# Patient Record
Sex: FEMALE | Race: ASIAN | NOT HISPANIC OR LATINO
[De-identification: names, ages, dates, MRNs, and addresses within clinical notes are randomized per-mention and may not be internally consistent; named-entity substitution may affect disease eponyms.]

---

## 2017-06-02 ENCOUNTER — APPOINTMENT (OUTPATIENT)
Dept: MAMMOGRAPHY | Facility: CLINIC | Age: 62
End: 2017-06-02

## 2017-06-02 ENCOUNTER — APPOINTMENT (OUTPATIENT)
Dept: ULTRASOUND IMAGING | Facility: CLINIC | Age: 62
End: 2017-06-02

## 2017-06-02 ENCOUNTER — OUTPATIENT (OUTPATIENT)
Dept: OUTPATIENT SERVICES | Facility: HOSPITAL | Age: 62
LOS: 1 days | End: 2017-06-02
Payer: COMMERCIAL

## 2017-06-02 DIAGNOSIS — Z00.8 ENCOUNTER FOR OTHER GENERAL EXAMINATION: ICD-10-CM

## 2017-06-02 DIAGNOSIS — Z98.89 OTHER SPECIFIED POSTPROCEDURAL STATES: Chronic | ICD-10-CM

## 2017-06-02 DIAGNOSIS — R92.2 INCONCLUSIVE MAMMOGRAM: ICD-10-CM

## 2017-06-02 PROCEDURE — 77063 BREAST TOMOSYNTHESIS BI: CPT

## 2017-06-02 PROCEDURE — 77067 SCR MAMMO BI INCL CAD: CPT

## 2017-06-02 PROCEDURE — 76830 TRANSVAGINAL US NON-OB: CPT

## 2017-06-02 PROCEDURE — 76641 ULTRASOUND BREAST COMPLETE: CPT

## 2018-06-15 ENCOUNTER — OUTPATIENT (OUTPATIENT)
Dept: OUTPATIENT SERVICES | Facility: HOSPITAL | Age: 63
LOS: 1 days | End: 2018-06-15
Payer: COMMERCIAL

## 2018-06-15 ENCOUNTER — APPOINTMENT (OUTPATIENT)
Dept: ULTRASOUND IMAGING | Facility: CLINIC | Age: 63
End: 2018-06-15
Payer: COMMERCIAL

## 2018-06-15 ENCOUNTER — APPOINTMENT (OUTPATIENT)
Dept: MAMMOGRAPHY | Facility: CLINIC | Age: 63
End: 2018-06-15
Payer: COMMERCIAL

## 2018-06-15 DIAGNOSIS — Z00.8 ENCOUNTER FOR OTHER GENERAL EXAMINATION: ICD-10-CM

## 2018-06-15 DIAGNOSIS — Z98.89 OTHER SPECIFIED POSTPROCEDURAL STATES: Chronic | ICD-10-CM

## 2018-06-15 PROCEDURE — 76856 US EXAM PELVIC COMPLETE: CPT | Mod: 26,59

## 2018-06-15 PROCEDURE — 77063 BREAST TOMOSYNTHESIS BI: CPT | Mod: 26

## 2018-06-15 PROCEDURE — 76856 US EXAM PELVIC COMPLETE: CPT

## 2018-06-15 PROCEDURE — 76830 TRANSVAGINAL US NON-OB: CPT

## 2018-06-15 PROCEDURE — 76641 ULTRASOUND BREAST COMPLETE: CPT | Mod: 26,50

## 2018-06-15 PROCEDURE — 76830 TRANSVAGINAL US NON-OB: CPT | Mod: 26

## 2018-06-15 PROCEDURE — 76641 ULTRASOUND BREAST COMPLETE: CPT

## 2018-06-15 PROCEDURE — 77067 SCR MAMMO BI INCL CAD: CPT | Mod: 26

## 2018-06-15 PROCEDURE — 77067 SCR MAMMO BI INCL CAD: CPT

## 2018-06-15 PROCEDURE — 77063 BREAST TOMOSYNTHESIS BI: CPT

## 2019-03-25 ENCOUNTER — APPOINTMENT (OUTPATIENT)
Dept: INTERNAL MEDICINE | Facility: CLINIC | Age: 64
End: 2019-03-25

## 2019-07-11 ENCOUNTER — OUTPATIENT (OUTPATIENT)
Dept: OUTPATIENT SERVICES | Facility: HOSPITAL | Age: 64
LOS: 1 days | End: 2019-07-11
Payer: COMMERCIAL

## 2019-07-11 ENCOUNTER — APPOINTMENT (OUTPATIENT)
Dept: ULTRASOUND IMAGING | Facility: CLINIC | Age: 64
End: 2019-07-11
Payer: COMMERCIAL

## 2019-07-11 ENCOUNTER — APPOINTMENT (OUTPATIENT)
Dept: RADIOLOGY | Facility: CLINIC | Age: 64
End: 2019-07-11
Payer: COMMERCIAL

## 2019-07-11 ENCOUNTER — APPOINTMENT (OUTPATIENT)
Dept: MAMMOGRAPHY | Facility: CLINIC | Age: 64
End: 2019-07-11
Payer: COMMERCIAL

## 2019-07-11 DIAGNOSIS — Z98.89 OTHER SPECIFIED POSTPROCEDURAL STATES: Chronic | ICD-10-CM

## 2019-07-11 DIAGNOSIS — Z00.8 ENCOUNTER FOR OTHER GENERAL EXAMINATION: ICD-10-CM

## 2019-07-11 PROCEDURE — 77080 DXA BONE DENSITY AXIAL: CPT

## 2019-07-11 PROCEDURE — 77063 BREAST TOMOSYNTHESIS BI: CPT

## 2019-07-11 PROCEDURE — 76641 ULTRASOUND BREAST COMPLETE: CPT | Mod: 26,50

## 2019-07-11 PROCEDURE — 76830 TRANSVAGINAL US NON-OB: CPT | Mod: 26

## 2019-07-11 PROCEDURE — 76830 TRANSVAGINAL US NON-OB: CPT

## 2019-07-11 PROCEDURE — 77063 BREAST TOMOSYNTHESIS BI: CPT | Mod: 26

## 2019-07-11 PROCEDURE — 77080 DXA BONE DENSITY AXIAL: CPT | Mod: 26

## 2019-07-11 PROCEDURE — 76856 US EXAM PELVIC COMPLETE: CPT | Mod: 26,59

## 2019-07-11 PROCEDURE — 76856 US EXAM PELVIC COMPLETE: CPT

## 2019-07-11 PROCEDURE — 77067 SCR MAMMO BI INCL CAD: CPT

## 2019-07-11 PROCEDURE — 77067 SCR MAMMO BI INCL CAD: CPT | Mod: 26

## 2019-07-11 PROCEDURE — 76641 ULTRASOUND BREAST COMPLETE: CPT

## 2019-07-15 ENCOUNTER — APPOINTMENT (OUTPATIENT)
Dept: SURGERY | Facility: CLINIC | Age: 64
End: 2019-07-15
Payer: COMMERCIAL

## 2019-07-15 DIAGNOSIS — N60.91 UNSPECIFIED BENIGN MAMMARY DYSPLASIA OF RIGHT BREAST: ICD-10-CM

## 2019-07-15 PROCEDURE — 99243 OFF/OP CNSLTJ NEW/EST LOW 30: CPT

## 2019-07-15 RX ORDER — METFORMIN HYDROCHLORIDE 500 MG/1
500 TABLET, COATED ORAL
Refills: 0 | Status: ACTIVE | COMMUNITY

## 2019-07-15 NOTE — ASSESSMENT
[FreeTextEntry1] : Patient with history of stage I invasive ductal carcinoma left breast with suspicious right breast nodule. . I recommended US guided core biopsy, If benign, f/u US 1/2020. rto 6 mo

## 2019-07-15 NOTE — HISTORY OF PRESENT ILLNESS
[FreeTextEntry1] : Patient referred by Dr. Rand for evaluation of suspicious right breast nodule. Patient has a prior history of left stage I breast cancer diagnosed in May 2011 treated with partial mastectomy and sentinel lymph node excision followed by  radiation and tamoxifen. right breast biopsy 2015 [facility], papilloma, on slide reviewed no atypia identified and observation chosen.  Patient denies current breast mass or nipple discharge. Menarche 11,  first child born when patient was 37, menopause 53, denies hormone replacement therapy.  \par Bilateral mammo and US 19 right br nodule 11 o'clock 9 x 3 x 7 mm, possible intraductal, biopsy recommended. \par \par

## 2019-07-15 NOTE — PHYSICAL EXAM
[Normocephalic] : normocephalic [EOMI] : extra ocular movement intact [Sclera nonicteric] : sclera nonicteric [Supple] : supple [No Supraclavicular Adenopathy] : no supraclavicular adenopathy [No Cervical Adenopathy] : no cervical adenopathy [Examined in the supine and seated position] : examined in the supine and seated position [Symmetrical] : symmetrical [No dominant masses] : no dominant masses in right breast  [No dominant masses] : no dominant masses left breast [No Nipple Retraction] : no left nipple retraction [No Nipple Discharge] : no left nipple discharge [No Axillary Lymphadenopathy] : no left axillary lymphadenopathy [Soft] : abdomen soft [No Swelling] : no swelling [No Rashes] : no rashes [de-identified] : healed incision [de-identified] : healed incisions

## 2019-07-15 NOTE — CONSULT LETTER
[Dear  ___] : Dear  [unfilled], [Consult Letter:] : I had the pleasure of evaluating your patient, [unfilled]. [Please see my note below.] : Please see my note below. [Consult Closing:] : Thank you very much for allowing me to participate in the care of this patient.  If you have any questions, please do not hesitate to contact me. [FreeTextEntry2] : Dr. Ellen Rand [FreeTextEntry3] : Sincerely yours,\par \par Steph Major MD, FACS\par Assistant Professor of Surgery\par [facility]

## 2019-07-16 ENCOUNTER — APPOINTMENT (OUTPATIENT)
Dept: ULTRASOUND IMAGING | Facility: CLINIC | Age: 64
End: 2019-07-16

## 2019-07-22 ENCOUNTER — FORM ENCOUNTER (OUTPATIENT)
Age: 64
End: 2019-07-22

## 2019-07-23 ENCOUNTER — APPOINTMENT (OUTPATIENT)
Dept: ULTRASOUND IMAGING | Facility: CLINIC | Age: 64
End: 2019-07-23
Payer: COMMERCIAL

## 2019-07-23 ENCOUNTER — OUTPATIENT (OUTPATIENT)
Dept: OUTPATIENT SERVICES | Facility: HOSPITAL | Age: 64
LOS: 1 days | End: 2019-07-23
Payer: COMMERCIAL

## 2019-07-23 ENCOUNTER — RESULT REVIEW (OUTPATIENT)
Age: 64
End: 2019-07-23

## 2019-07-23 DIAGNOSIS — Z98.89 OTHER SPECIFIED POSTPROCEDURAL STATES: Chronic | ICD-10-CM

## 2019-07-23 DIAGNOSIS — N60.01 SOLITARY CYST OF RIGHT BREAST: ICD-10-CM

## 2019-07-23 DIAGNOSIS — Z00.8 ENCOUNTER FOR OTHER GENERAL EXAMINATION: ICD-10-CM

## 2019-07-23 PROCEDURE — A4648: CPT

## 2019-07-23 PROCEDURE — 77065 DX MAMMO INCL CAD UNI: CPT | Mod: 26,RT

## 2019-07-23 PROCEDURE — 88305 TISSUE EXAM BY PATHOLOGIST: CPT | Mod: 26

## 2019-07-23 PROCEDURE — 19083 BX BREAST 1ST LESION US IMAG: CPT | Mod: RT

## 2019-07-23 PROCEDURE — 88305 TISSUE EXAM BY PATHOLOGIST: CPT

## 2019-07-23 PROCEDURE — 19083 BX BREAST 1ST LESION US IMAG: CPT

## 2019-07-23 PROCEDURE — 77065 DX MAMMO INCL CAD UNI: CPT

## 2019-07-25 LAB — SURGICAL PATHOLOGY STUDY: SIGNIFICANT CHANGE UP

## 2020-01-13 ENCOUNTER — APPOINTMENT (OUTPATIENT)
Dept: SURGERY | Facility: CLINIC | Age: 65
End: 2020-01-13

## 2020-07-28 ENCOUNTER — APPOINTMENT (OUTPATIENT)
Dept: ORTHOPEDIC SURGERY | Facility: CLINIC | Age: 65
End: 2020-07-28
Payer: COMMERCIAL

## 2020-07-28 VITALS
WEIGHT: 118 LBS | DIASTOLIC BLOOD PRESSURE: 65 MMHG | HEART RATE: 69 BPM | SYSTOLIC BLOOD PRESSURE: 115 MMHG | HEIGHT: 61 IN | BODY MASS INDEX: 22.28 KG/M2

## 2020-07-28 DIAGNOSIS — Z86.39 PERSONAL HISTORY OF OTHER ENDOCRINE, NUTRITIONAL AND METABOLIC DISEASE: ICD-10-CM

## 2020-07-28 PROCEDURE — 99204 OFFICE O/P NEW MOD 45 MIN: CPT

## 2020-07-28 PROCEDURE — 72100 X-RAY EXAM L-S SPINE 2/3 VWS: CPT

## 2020-07-28 PROCEDURE — 73564 X-RAY EXAM KNEE 4 OR MORE: CPT | Mod: LT

## 2020-07-28 RX ORDER — MELOXICAM 15 MG/1
15 TABLET ORAL DAILY
Qty: 30 | Refills: 2 | Status: ACTIVE | COMMUNITY
Start: 2020-07-28 | End: 1900-01-01

## 2020-07-28 NOTE — DISCUSSION/SUMMARY
[de-identified] : This patient has moderate degenerative disc disease of the lumbar spine as well as mild left knee osteoarthritis.  The patient is not an appropriate candidate for surgical intervention at this time. An extensive discussion was conducted on the natural history of the disease and the variety of surgical and non-surgical options available to the patient including, but not limited to non-steroidal anti-inflammatory medications, steroid injections, physical therapy, maintenance of ideal body weight, and reduction of activity.  I recommended and prescribed a course of Mobic and physical therapy.  The patient is also encouraged to trial a neoprene sleeve knee brace which can be purchased OTC.  The patient is also encouraged to consider use of a cane.  I would like to obtain an MRI of the lumbar spine to evaluate for spinal stenosis.  The patient will be sent for a MRI of the lumbar spine. They will notify me when the MRI is complete and we will arrange for either an in person or telehealth virtual visit to review the results as well as any next steps in the plan.  If the MRI does confirm spinal stenosis then she will referred to physiatry for consideration of epidural steroid injections or selective nerve root injections.\par

## 2020-07-28 NOTE — REASON FOR VISIT
[Initial Visit] : an initial visit for [Knee Pain] : knee pain [Radiculopathy] : radiculopathy [Family Member] : family member

## 2020-07-28 NOTE — HISTORY OF PRESENT ILLNESS
[de-identified] : This is very nice 65-year-old female experiencing left knee pain, which is moderate in intensity. Patient states has had mild intermittent pain however several days ago pain increased in severity with swelling. patient uses Ibuprofen with relief.  She also complains of low back pain that radiates from her low back down the leg to the foot that is associate with numbness and tingling but no weakness.  No bowel or bladder incontinence.  The pain substantially limits activities of daily living. Walking tolerance is reduced. The patient has no tried any treatments to this point including  activity modification, use of an assistive device such as a cane or walker, or physical therapy.  Ibuprofen does provide some relief.  The patient does not use a neoprene sleeve knee brace.

## 2020-07-28 NOTE — PHYSICAL EXAM
[de-identified] : Patient is well nourished, well-developed, in no acute distress, with appropriate mood and affect. The patient is oriented to time, place, and person. Respirations are even and unlabored. Gait evaluation does reveal a limp. There is no inguinal adenopathy. Examination of the contralateral knee shows normal range of motion, strength, no tenderness, and intact skin. The affected limb is well-perfused, without skin lesions, shows a grossly normal motor and sensory examination. Knee motion is significantly reduced and does cause significant pain. The knee moves from 0 to 125 degrees. The knee is stable within that range-of-motion to AP and ML stress. The alignment of the knee is 5 degrees varus. Muscle strength is normal. Pedal pulses are palpable. Hip examination was negative.\par Examination of the lumbar spine reveals full range of motion without pain. There is no tenderness to palpation of the osseous structures or paravertebral soft tissues. There is no muscle spasm. Straight leg raise is negative bilaterally. [de-identified] : Long standing knee, AP knee, lateral knee, and patellar views of the left knee were ordered and taken in the office and demonstrate mild degenerative joint disease of the medial compartment of the knee with joint space narrowing, osteophyte formation, and subchondral sclerosis.\par \par AP and lateral radiographs of the lumbar spine were ordered and obtained the office and demonstrate moderate degenerative disc disease of the lumbar spine but no evidence of spondylolysis or spondylolisthesis

## 2020-07-31 ENCOUNTER — APPOINTMENT (OUTPATIENT)
Dept: ORTHOPEDIC SURGERY | Facility: CLINIC | Age: 65
End: 2020-07-31

## 2020-08-10 ENCOUNTER — APPOINTMENT (OUTPATIENT)
Dept: ORTHOPEDIC SURGERY | Facility: CLINIC | Age: 65
End: 2020-08-10
Payer: COMMERCIAL

## 2020-08-10 PROCEDURE — 99442: CPT

## 2020-08-24 ENCOUNTER — APPOINTMENT (OUTPATIENT)
Dept: PHYSICAL MEDICINE AND REHAB | Facility: CLINIC | Age: 65
End: 2020-08-24
Payer: COMMERCIAL

## 2020-08-24 VITALS
TEMPERATURE: 98 F | HEIGHT: 61 IN | SYSTOLIC BLOOD PRESSURE: 147 MMHG | DIASTOLIC BLOOD PRESSURE: 88 MMHG | HEART RATE: 77 BPM | BODY MASS INDEX: 22.28 KG/M2 | WEIGHT: 118 LBS | OXYGEN SATURATION: 97 %

## 2020-08-24 PROCEDURE — 99203 OFFICE O/P NEW LOW 30 MIN: CPT

## 2020-08-24 RX ORDER — MELOXICAM 15 MG/1
15 TABLET ORAL DAILY
Qty: 30 | Refills: 1 | Status: ACTIVE | COMMUNITY
Start: 2020-08-24 | End: 1900-01-01

## 2020-08-24 NOTE — PHYSICAL EXAM
[Normal] : Oriented to person, place, and time, insight and judgement were intact and the affect was normal [de-identified] : no resp distress [de-identified] : well perfused, nml pulses [de-identified] : L knee w slight effusion, lacks about 5 degrees of flextion, + crepitus and med jt line tenderness, neg antr/postr. drawer. [de-identified] : soft [de-identified] : Ulysses morales;.

## 2020-08-24 NOTE — ASSESSMENT
[FreeTextEntry1] : - Advised to take Meloxicam daily x 3 wks and then prn\par - Referred to PT for L knee and lumbar spine\par - Asked to send imaging results.\par - Referred to Dr. Dacosta for US evaluation and possible aspiration/injection of L knee- will try to schedule soon so she does not have pain at her daughters wedding.\par - Discussed with patient red flags such as bladder/bowel incontinence, weakness, significant increase in pain level in which case patient should immediately present to Emergency Room.\par - f/u prn.

## 2020-08-27 ENCOUNTER — APPOINTMENT (OUTPATIENT)
Dept: PHYSICAL MEDICINE AND REHAB | Facility: CLINIC | Age: 65
End: 2020-08-27
Payer: COMMERCIAL

## 2020-08-27 VITALS
TEMPERATURE: 97.1 F | OXYGEN SATURATION: 96 % | DIASTOLIC BLOOD PRESSURE: 81 MMHG | HEART RATE: 78 BPM | SYSTOLIC BLOOD PRESSURE: 131 MMHG

## 2020-08-27 PROCEDURE — 99215 OFFICE O/P EST HI 40 MIN: CPT | Mod: 25

## 2020-08-27 PROCEDURE — 20611 DRAIN/INJ JOINT/BURSA W/US: CPT | Mod: LT

## 2020-08-27 RX ORDER — METHYLPREDNISOLONE 4 MG/1
4 TABLET ORAL
Qty: 1 | Refills: 0 | Status: ACTIVE | COMMUNITY
Start: 2020-08-27 | End: 1900-01-01

## 2020-08-29 NOTE — ASSESSMENT
[FreeTextEntry1] : 66 yo F who presents with left knee pain.  Left knee aspiration of dark red fluid consistent with hemarthrosis.  Differential diagnosis is broad and includes septic arthritis despite lack of fevers or systemic signs or symptoms.  Despite patient's reporting of non-traumatic onset, I also have a suspicion of injury to ligamentous or cartilaginous structures.  Hemarthrosis also concerning for possible TGCT.\par \par -MRI left knee w/o contrast ordered\par -Left knee aspiration performed.  Aspirate was sent to cell cultures/gram stain, cell counts, and crystals.\par -IM toradol injection performed on this visit\par -RTC following imaging to review results.\par \par Jagdish Dacosta MD\par Spine and Sports Medicine\par \par [employer]\par At [facility]\par \par

## 2020-08-29 NOTE — PHYSICAL EXAM
[FreeTextEntry1] : Gen: NAD, sitting comfortably in a chair\par HEENT: neck supple\par CV: no cyanosis\par Pulm: breathing well on RA\par Abd: soft\par Left knee: +edema, no erythma, ROM limited in all planes, tenderness to palpation medial joint line, +crepitus, neg miguel, neg varus/valgus laxity, neg ant/post drawer\par Low back: ROM limited in all planes secondary to pain, tenderness to palpation lower lumbar paraspinals, neg facet loading, neg FABERE, neg FAIR\par Msk: 5/5 hip flexion B/L, 5/5 knee extension B/L, 5/5 knee flexion B/L, 5/5 dorsiflexion B/L, 5/5 plantarflexion B/L\par Neuro: sensation intact to light touch in all dermatomes in bilateral lower extremities, neg babinski, neg bhatia, 2+ reflexes in bilateral patella, medial hamstrings, and achilles\par

## 2020-08-29 NOTE — PROCEDURE
[de-identified] : Procedure: Ultrasound guided knee aspiration. \par        Side: LEFT\par        Medical Necessity for ultrasound use: Ultrasound guided injection is medically necessary in order to maintain safety and localize injectate to desired location. Visual guidance of structures otherwise not palpable on examination or identified by landmarks is necessary for injection into this structure. The use of ultrasound helps to visualize nerves, vasculature, tendons, ligaments and other soft tissues which may be infiltrated during the course of interventional injection and needle placement. For this reason it is medically necessary to use ultrasound guidance for the injection performed both to avoid injurying or displacing unintended soft tissue structures as well as to improve accuracy which has been shown to directly increase post treatment symptom improvement and resolution when compared to injections performed without guidance. \par        Patient positioning: supine. \par        Target Identification: The body region was palpated as needed in order to localize the area of maximal tenderness. The overlying skin was marked as necessary . \par        Skin Sterilization: The overlying skin was widely prepped with a chloraprep, which was then allowed to dry for 30 seconds. \par        Probe Sterilization: After the ultrasound probe was cleansed with a PDI wipe, it was sterilized with Chloraprep and sterile ultrasound gel was applied as needed. \par        Ultrasound visualization  was then performed to identify the target and estimate the needle length. \par        Procedure: A 27 gauge 1.5 inch needle was then inserted through the sterilized skin and directed to the target as small amounts of lidocaine were injected through it into the overlying skin, subcutaneous tissue, and the overlying tissue as needed. Next, a 21 gauge 2 inch needle attached by an extension tube to a 20 cc syringe was guided toward an area of anechoic signal in the suprapatellar recess representing a knee joint effusion.  20 cc of dark reddish fluid was aspirated consistent with hemarthrosis.  Aspiration was deposited into collection tubes and properly labeled.  Specimens were sent stat to [facility] lab for cell counts, culture, gram stain and crystal analysis.  \par        Injectate: 3 cc 0.5% lidocaine \par        Post Procedure: A Band-Aid was then applied and the patient was advised to leave this on until the next day. The patient tolerated the procedure well and reported significant pain releif following the procedure.\par To relieve pain an IM injection of Toradol (15 mg) was performed into patient's right shoulder.\par

## 2020-08-29 NOTE — HISTORY OF PRESENT ILLNESS
[FreeTextEntry1] : Patient is a 66yo F with PMHx of pre-diabetes, arthritis of the left knee, HTN, hypothyroidism, and stage 1 breast cancer of the left breast s/p partial mastectomy, who presents for initial visit following referral from Dr. Miles for evaluation of left knee pain. Patient reports symptoms that come and go and started in late July 2020. Patient currently still with significant pain with ambulation; reports swelling in left knee, though improved from last Sunday when it initially started to flare. Denied history of recent trauma, injury or falls.  Patient reports that initial onset was in late July but pain had resolved following treatment with meloxicam and physical therapy prescribed by Dr. Cordova.  Patient reports that pain is worse with prolonged standing and ambulation.  XR left consistent with mild osteoarthritis.  Patient endorses swelling.  Denies erythema, redness or warmth.  Denies clicking or locking, however endorses some occasional buckling.  \par \par Of note, patient reports history of chronic low back pain; current issue however, is regarding her left knee. Patient's daughter, who was present for the visit is getting  soon and patient would like pain relief prior to the event.  Patient denies new weakness, numbness or paresthesia.  Denies bowel/bladder dysfunction, fevers, chills, weight loss, night pain, or night sweats.\par

## 2020-08-31 LAB
B PERT IGG+IGM PNL SER: ABNORMAL
COLOR FLD: NORMAL
EOSINOPHIL # FLD MANUAL: 1 %
FLUID INTAKE SUBSTANCE CLASS: NORMAL
LYMPHOCYTES # FLD MANUAL: 0 %
MESOTHL CELL NFR FLD: 0 %
MONOS+MACROS NFR FLD MANUAL: 72 %
NEUTS SEG # FLD MANUAL: 27 %
NRBC # FLD: 0
RBC # FLD MANUAL: ABNORMAL /UL
SYCRY CLARITY: ABNORMAL
SYCRY COLOR: ABNORMAL
SYCRY ID: NORMAL
SYCRY TUBE: NORMAL
TOTAL CELLS COUNTED FLD: 3300 /UL
TUBE TYPE: NORMAL
UNIDENT CELLS NFR FLD MANUAL: 0 %
VARIANT LYMPHS # FLD MANUAL: 0 %

## 2020-09-14 LAB — BACTERIA FLD CULT: NORMAL

## 2020-09-17 ENCOUNTER — APPOINTMENT (OUTPATIENT)
Dept: PHYSICAL MEDICINE AND REHAB | Facility: CLINIC | Age: 65
End: 2020-09-17

## 2020-09-21 ENCOUNTER — APPOINTMENT (OUTPATIENT)
Dept: PHYSICAL MEDICINE AND REHAB | Facility: CLINIC | Age: 65
End: 2020-09-21
Payer: COMMERCIAL

## 2020-09-21 VITALS
HEART RATE: 81 BPM | TEMPERATURE: 97.7 F | OXYGEN SATURATION: 97 % | DIASTOLIC BLOOD PRESSURE: 84 MMHG | SYSTOLIC BLOOD PRESSURE: 135 MMHG

## 2020-09-21 PROCEDURE — 99214 OFFICE O/P EST MOD 30 MIN: CPT | Mod: 25

## 2020-09-21 PROCEDURE — 20611 DRAIN/INJ JOINT/BURSA W/US: CPT | Mod: LT

## 2020-09-22 NOTE — ASSESSMENT
[FreeTextEntry1] : 66 yo F who presents with left knee pain.  Left knee aspiration of dark red fluid consistent with hemarthrosis.  \par \par -MRI left knee w/o contrast reviewed\par - Synovial fluid culture, gram stain, cell counts, and crystal analysis was reviewed\par - US guided left knee aspiration and corticosteroid injection performed on this visit.  Aspiration of serosanguineous fluid on this visit.\par -RTC 2 weeks\par \par Jagdish Dacosta MD\par Spine and Sports Medicine\par \par [employer]\par At hospitals/[facility]\par \par

## 2020-09-22 NOTE — HISTORY OF PRESENT ILLNESS
[FreeTextEntry1] : 9/22/20\par 66 yo F who presents for follow up with left knee and low back pain.  Patient last seen on 8/27/20 and thoracentesis led to aspiration of bloody fluid.  Labs were sent based on fluid collection and was unremarkable.  MRI left knee w/o contrast shows prominent chondromalacia patella with a deep chondral fissure at the medial femoral condyle, subtle horizontal tear of the anterior horn of the lateral meniscus, djd.  In the interim since our last visit, patient prescribed a medrol dose pack without improvement for her knee or back pain.  Patient denies clicking, locking, or buckling.  Patient denies new weakness, numbness or paresthesia.  Denies bowel/bladder dysfunction, fevers, chills, weight loss, night pain, or night sweats.\par \par 8/27/20\par Patient is a 66yo F with PMHx of pre-diabetes, arthritis of the left knee, HTN, hypothyroidism, and stage 1 breast cancer of the left breast s/p partial mastectomy, who presents for initial visit following referral from Dr. Miles for evaluation of left knee pain. Patient reports symptoms that come and go and started in late July 2020. Patient currently still with significant pain with ambulation; reports swelling in left knee, though improved from last Sunday when it initially started to flare. Denied history of recent trauma, injury or falls.  Patient reports that initial onset was in late July but pain had resolved following treatment with meloxicam and physical therapy prescribed by Dr. Cordova.  Patient reports that pain is worse with prolonged standing and ambulation.  XR left consistent with mild osteoarthritis.  Patient endorses swelling.  Denies erythema, redness or warmth.  Denies clicking or locking, however endorses some occasional buckling.  \par \par Of note, patient reports history of chronic low back pain; current issue however, is regarding her left knee. Patient's daughter, who was present for the visit is getting  soon and patient would like pain relief prior to the event.  Patient denies new weakness, numbness or paresthesia.  Denies bowel/bladder dysfunction, fevers, chills, weight loss, night pain, or night sweats.\par

## 2020-09-22 NOTE — PHYSICAL EXAM
[FreeTextEntry1] : Gen: NAD, sitting comfortably in a chair\par HEENT: neck supple\par CV: no cyanosis\par Pulm: breathing well on RA\par Abd: soft\par Left knee: +edema, no erythma, FAROM with pain on extreme flexion and extension, tenderness to palpation medial joint line, +crepitus, +segovia's, neg miguel, neg varus/valgus laxity, neg ant/post drawer\par Low back: ROM limited in all planes secondary to pain, tenderness to palpation lower lumbar paraspinals, neg facet loading, neg FABERE, neg FAIR\par Msk: 5/5 hip flexion B/L, 5/5 knee extension B/L, 5/5 knee flexion B/L, 5/5 dorsiflexion B/L, 5/5 plantarflexion B/L\par Neuro: sensation intact to light touch in all dermatomes in bilateral lower extremities, neg babinski, neg bhatia, 2+ reflexes in bilateral patella, medial hamstrings, and achilles\par

## 2020-09-22 NOTE — PROCEDURE
[de-identified] : Procedure: Ultrasound guided corticosteroid knee Injection. \par        Side: LEFT \par        Medical Necessity for ultrasound use: Ultrasound guided injection is medically necessary in order to maintain safety and localize injectate to desired location. Visual guidance of structures otherwise not palpable on examination or identified by landmarks is necessary for injection into this structure. The use of ultrasound helps to visualize nerves, vasculature, tendons, ligaments and other soft tissues which may be infiltrated during the course of interventional injection and needle placement. For this reason it is medically necessary to use ultrasound guidance for the injection performed both to avoid injurying or displacing unintended soft tissue structures as well as to improve accuracy which has been shown to directly increase post treatment symptom improvement and resolution when compared to injections performed without guidance. \par        Patient positioning: supine. \par        Target Identification: The body region was palpated as needed in order to localize the area of maximal tenderness. The overlying skin was marked as necessary . \par        Skin Sterilization: The overlying skin was widely prepped with a chloraprep, which was then allowed to dry for 30 seconds. \par        Probe Sterilization: After the ultrasound probe was cleansed with a PDI wipe, it was sterilized with Chloraprep and sterile ultrasound gel was applied as needed. \par        Ultrasound visualization  was then performed to identify the target and estimate the needle length. \par        Procedure: A 27 gauge 1.5 inch needle was then inserted through the sterilized skin and directed to the target as small amounts of lidocaine were injected through it into the overlying skin, subcutaneous tissue, and the overlying tissue as needed. Next, a 21 gauge 2 inch needle attached by an extension tube to a 20 cc syringe was guided toward an area of anechoic signal in the suprapatellar recess representing a knee joint effusion.  35 cc of serosanguinous fluid was aspirated and discharged.  A 5 cc syringe filled with the injectate listed below was exchanged onto the extension tube and the target structure was then injected with the injectate listed below under direct ultrasound visualization. Aspiration was negative for blood prior to injection. \par        Injectate: 3 cc 0.5% lidocaine and 20 mg kenalog\par        Total volume: 3.5 cc\par        Post Procedure: A Band-Aid was then applied and the patient was advised to leave this on until the next day. The patient tolerated the procedure well and reported significant pain releif following the procedure.\par

## 2020-10-01 ENCOUNTER — APPOINTMENT (OUTPATIENT)
Dept: PHYSICAL MEDICINE AND REHAB | Facility: CLINIC | Age: 65
End: 2020-10-01

## 2020-10-01 ENCOUNTER — APPOINTMENT (OUTPATIENT)
Dept: PHYSICAL MEDICINE AND REHAB | Facility: CLINIC | Age: 65
End: 2020-10-01
Payer: COMMERCIAL

## 2020-10-01 VITALS
DIASTOLIC BLOOD PRESSURE: 83 MMHG | OXYGEN SATURATION: 97 % | HEART RATE: 69 BPM | TEMPERATURE: 97.6 F | SYSTOLIC BLOOD PRESSURE: 133 MMHG

## 2020-10-01 PROCEDURE — 99214 OFFICE O/P EST MOD 30 MIN: CPT | Mod: 25

## 2020-10-01 PROCEDURE — 20611 DRAIN/INJ JOINT/BURSA W/US: CPT | Mod: LT

## 2020-10-01 NOTE — PHYSICAL EXAM
[FreeTextEntry1] : Gen: NAD, sitting comfortably in a chair\par HEENT: neck supple\par CV: no cyanosis\par Pulm: breathing well on RA\par Abd: soft\par Left knee: some edema, no erythma, FAROM with pain on extreme flexion and extension, tenderness to palpation medial joint line, +crepitus, +segovia's, neg miguel, neg varus/valgus laxity, neg ant/post drawer\par Low back: ROM limited in all planes secondary to pain, tenderness to palpation lower lumbar paraspinals, neg facet loading, neg FABERE, neg FAIR\par Msk: 5/5 hip flexion B/L, 5/5 knee extension B/L, 5/5 knee flexion B/L, 5/5 dorsiflexion B/L, 5/5 plantarflexion B/L\par Neuro: sensation intact to light touch in all dermatomes in bilateral lower extremities, neg babinski, neg bhatia, 2+ reflexes in bilateral patella, medial hamstrings, and achilles\par

## 2020-10-01 NOTE — ASSESSMENT
[FreeTextEntry1] : 64 yo F who presents with left knee pain.  Patient reports significant improvement in left knee following CSI and aspiration.  Will repeat injection. \par \par - US guided left knee aspiration and corticosteroid injection performed on this visit.  Aspiration of serosanguineous fluid on this visit.\par -ice and tylenol prn pain\par -Patient is reluctant to undergo surgical procedure at this time.  Will obtain insurance approval for synvisc injections.\par -RTC 2 weeks\par \par Jagdish Dacosta MD\par Spine and Sports Medicine\par \par [employer]\par At \[facility]\""/[facility]\par \par

## 2020-10-01 NOTE — HISTORY OF PRESENT ILLNESS
[FreeTextEntry1] : 10/1/20\par 66 yo F who presents for follow up with left knee and low back pain.  Patient reports that left knee pain has improved significantly following US guided aspiration and CSI performed on 9/22.  Patient however continues to complain of significant pain and residual swelling following her last injection.  Denies clicking, locking, or buckling.  Patient denies new weakness, numbness or paresthesia.  Denies bowel/bladder dysfunction, fevers, chills, weight loss, night pain, or night sweats.\par \par 9/22/20\par 66 yo F who presents for follow up with left knee and low back pain.  Patient last seen on 8/27/20 and thoracentesis led to aspiration of bloody fluid.  Labs were sent based on fluid collection and was unremarkable.  MRI left knee w/o contrast shows prominent chondromalacia patella with a deep chondral fissure at the medial femoral condyle, subtle horizontal tear of the anterior horn of the lateral meniscus, djd.  In the interim since our last visit, patient prescribed a medrol dose pack without improvement for her knee or back pain.  Patient denies clicking, locking, or buckling.  Patient denies new weakness, numbness or paresthesia.  Denies bowel/bladder dysfunction, fevers, chills, weight loss, night pain, or night sweats.\par \par 8/27/20\par Patient is a 66yo F with PMHx of pre-diabetes, arthritis of the left knee, HTN, hypothyroidism, and stage 1 breast cancer of the left breast s/p partial mastectomy, who presents for initial visit following referral from Dr. Miles for evaluation of left knee pain. Patient reports symptoms that come and go and started in late July 2020. Patient currently still with significant pain with ambulation; reports swelling in left knee, though improved from last Sunday when it initially started to flare. Denied history of recent trauma, injury or falls.  Patient reports that initial onset was in late July but pain had resolved following treatment with meloxicam and physical therapy prescribed by Dr. Cordova.  Patient reports that pain is worse with prolonged standing and ambulation.  XR left consistent with mild osteoarthritis.  Patient endorses swelling.  Denies erythema, redness or warmth.  Denies clicking or locking, however endorses some occasional buckling.  \par \par Of note, patient reports history of chronic low back pain; current issue however, is regarding her left knee. Patient's daughter, who was present for the visit is getting  soon and patient would like pain relief prior to the event.  Patient denies new weakness, numbness or paresthesia.  Denies bowel/bladder dysfunction, fevers, chills, weight loss, night pain, or night sweats.\par

## 2020-10-01 NOTE — PROCEDURE
[de-identified] : Procedure: Ultrasound guided corticosteroid knee Injection. \par        Side: LEFT \par        Medical Necessity for ultrasound use: Ultrasound guided injection is medically necessary in order to maintain safety and localize injectate to desired location. Visual guidance of structures otherwise not palpable on examination or identified by landmarks is necessary for injection into this structure. The use of ultrasound helps to visualize nerves, vasculature, tendons, ligaments and other soft tissues which may be infiltrated during the course of interventional injection and needle placement. For this reason it is medically necessary to use ultrasound guidance for the injection performed both to avoid injurying or displacing unintended soft tissue structures as well as to improve accuracy which has been shown to directly increase post treatment symptom improvement and resolution when compared to injections performed without guidance. \par        Patient positioning: supine. \par        Target Identification: The body region was palpated as needed in order to localize the area of maximal tenderness. The overlying skin was marked as necessary . \par        Skin Sterilization: The overlying skin was widely prepped with a chloraprep, which was then allowed to dry for 30 seconds. \par        Probe Sterilization: After the ultrasound probe was cleansed with a PDI wipe, it was sterilized with Chloraprep and sterile ultrasound gel was applied as needed. \par        Ultrasound visualization  was then performed to identify the target and estimate the needle length. \par        Procedure: A 27 gauge 1.5 inch needle was then inserted through the sterilized skin and directed to the target as small amounts of lidocaine were injected through it into the overlying skin, subcutaneous tissue, and the overlying tissue as needed. Next, a 21 gauge 2 inch needle attached by an extension tube to a 20 cc syringe was guided toward an area of anechoic signal in the suprapatellar recess representing a knee joint effusion.  5 cc of serosanguinous fluid was aspirated and discharged.  A 5 cc syringe filled with the injectate listed below was exchanged onto the extension tube and the target structure was then injected with the injectate listed below under direct ultrasound visualization. Aspiration was negative for blood prior to injection. \par        Injectate: 3 cc 0.5% lidocaine and 20 mg kenalog\par        Total volume: 3.5 cc\par        Post Procedure: A Band-Aid was then applied and the patient was advised to leave this on until the next day. The patient tolerated the procedure well and reported significant pain releif following the procedure.\par

## 2020-10-02 RX ORDER — HYALURONATE SODIUM 30 MG/2 ML
30 SYRINGE (ML) INTRAARTICULAR
Qty: 3 | Refills: 0 | Status: ACTIVE | OUTPATIENT
Start: 2020-10-01

## 2020-10-02 RX ORDER — HYLAN G-F 20 16MG/2ML
16 SYRINGE (ML) INTRAARTICULAR
Qty: 1 | Refills: 0 | Status: ACTIVE | OUTPATIENT
Start: 2020-10-01

## 2020-10-15 ENCOUNTER — APPOINTMENT (OUTPATIENT)
Dept: PHYSICAL MEDICINE AND REHAB | Facility: CLINIC | Age: 65
End: 2020-10-15
Payer: COMMERCIAL

## 2020-10-15 VITALS
OXYGEN SATURATION: 99 % | DIASTOLIC BLOOD PRESSURE: 85 MMHG | TEMPERATURE: 96.9 F | HEART RATE: 79 BPM | SYSTOLIC BLOOD PRESSURE: 153 MMHG

## 2020-10-15 DIAGNOSIS — Z01.818 ENCOUNTER FOR OTHER PREPROCEDURAL EXAMINATION: ICD-10-CM

## 2020-10-15 PROCEDURE — 99214 OFFICE O/P EST MOD 30 MIN: CPT | Mod: 25

## 2020-10-15 PROCEDURE — 20611 DRAIN/INJ JOINT/BURSA W/US: CPT | Mod: LT

## 2020-10-15 NOTE — PHYSICAL EXAM
[FreeTextEntry1] : Gen: NAD, sitting comfortably in a chair\par HEENT: neck supple\par CV: no cyanosis\par Pulm: breathing well on RA\par Abd: soft\par Left knee: mild edema although significantly improved from last visit, no erythma, FAROM with pain on extreme flexion and extension, tenderness to palpation medial joint line, +crepitus, +segovia's, neg miguel, neg varus/valgus laxity, neg ant/post drawer\par Low back: ROM limited in all planes secondary to pain, tenderness to palpation lower lumbar paraspinals, neg facet loading, neg FABERE, neg FAIR\par Msk: 5/5 hip flexion B/L, 5/5 knee extension B/L, 5/5 knee flexion B/L, 5/5 dorsiflexion B/L, 5/5 plantarflexion B/L\par Neuro: sensation intact to light touch in all dermatomes in bilateral lower extremities, neg babinski, neg bhatia, 2+ reflexes in bilateral patella, medial hamstrings, and achilles\par

## 2020-10-15 NOTE — ASSESSMENT
[FreeTextEntry1] : 66 yo F who presents with left knee pain secondary to osteoarthritis.  Patient reports significant improvement in left knee following CSI and aspirations and presents now for series of viscosupplementation.  Patient also presents with low back pain consistent with lumbar radiculopathy, lumbar spinal stenosis, and lumbar disc herniation.  \par \par - US guided left knee viscosupplementation with Orthovisc (1 of 3)\par -MRI lumbar spine w/o contrast reviewed\par -I recommend that patient undergo left L4-L5, L5-S1 TFESI.  Risks and benefits discussed with patient.  Will submit for insurance approval.  Once insurance approval has been obtained, patient will be contacted to schedule injection at out-patient ambulatory center.  Covid-19 referral provided to patient on this visit and patient has been instructed to undergo testing per unit protocol.\par -RTC 1 week for 2 of 3 orthovisc injections.\par \par Jagdish Dacosta MD\par Spine and Sports Medicine\par \par [employer]\par At [facility], [place]\par \par

## 2020-10-15 NOTE — HISTORY OF PRESENT ILLNESS
[FreeTextEntry1] : 10/15/20\par 64 yo F who presents for follow up with left knee and low back pain.  Patient reports interim improvement in left knee pain with 2 x aspiration and CSI.  Patient reports now her low back pain is more prominent.  Low back pain radiates into the left lower extremity and worse with prolonged sitting and standing.  Patient reports that low back pain has been present for several months.  Patient denies new weakness, numbness or paresthesia.  Denies bowel/bladder dysfunction, fevers, chills, weight loss, night pain, or night sweats.\par \par 10/1/20\par 64 yo F who presents for follow up with left knee and low back pain.  Patient reports that left knee pain has improved significantly following US guided aspiration and CSI performed on 9/22.  Patient however continues to complain of significant pain and residual swelling following her last injection.  Denies clicking, locking, or buckling.  Patient denies new weakness, numbness or paresthesia.  Denies bowel/bladder dysfunction, fevers, chills, weight loss, night pain, or night sweats.\par \par 9/22/20\par 64 yo F who presents for follow up with left knee and low back pain.  Patient last seen on 8/27/20 and thoracentesis led to aspiration of bloody fluid.  Labs were sent based on fluid collection and was unremarkable.  MRI left knee w/o contrast shows prominent chondromalacia patella with a deep chondral fissure at the medial femoral condyle, subtle horizontal tear of the anterior horn of the lateral meniscus, djd.  In the interim since our last visit, patient prescribed a medrol dose pack without improvement for her knee or back pain.  Patient denies clicking, locking, or buckling.  Patient denies new weakness, numbness or paresthesia.  Denies bowel/bladder dysfunction, fevers, chills, weight loss, night pain, or night sweats.\par \par 8/27/20\par Patient is a 64yo F with PMHx of pre-diabetes, arthritis of the left knee, HTN, hypothyroidism, and stage 1 breast cancer of the left breast s/p partial mastectomy, who presents for initial visit following referral from Dr. Miles for evaluation of left knee pain. Patient reports symptoms that come and go and started in late July 2020. Patient currently still with significant pain with ambulation; reports swelling in left knee, though improved from last Sunday when it initially started to flare. Denied history of recent trauma, injury or falls.  Patient reports that initial onset was in late July but pain had resolved following treatment with meloxicam and physical therapy prescribed by Dr. Cordova.  Patient reports that pain is worse with prolonged standing and ambulation.  XR left consistent with mild osteoarthritis.  Patient endorses swelling.  Denies erythema, redness or warmth.  Denies clicking or locking, however endorses some occasional buckling.  \par \par Of note, patient reports history of chronic low back pain; current issue however, is regarding her left knee. Patient's daughter, who was present for the visit is getting  soon and patient would like pain relief prior to the event.  Patient denies new weakness, numbness or paresthesia.  Denies bowel/bladder dysfunction, fevers, chills, weight loss, night pain, or night sweats.\par

## 2020-10-15 NOTE — PROCEDURE
[de-identified] : Procedure: Ultrasound guided __ ORTHOVISC_______ Injections. \par        Side: LEFT. \par        Medical Necessity for ultrasound use: Ultrasound guided injection is medically necessary in order to maintain safety and localize injectate to desired location. Visual guidance of structures otherwise not palpable on examination or identified by landmarks is necessary for injection into this structure. The use of ultrasound helps to visualize nerves, vasculature, tendons, ligaments and other soft tissues which may be infiltrated during the course of interventional injection and needle placement. For this reason it is medically necessary to use ultrasound guidance for the injection performed both to avoid injurying or displacing unintended soft tissue structures as well as to improve accuracy which has been shown to directly increase post treatment symptom improvement and resolution when compared to injections performed without guidance. \par        Patient positioning: supine. \par        Target Identification: The body region was palpated as needed in order to localize the area of maximal tenderness. The overlying skin was marked as necessary . \par        Skin Sterilization: The overlying skin was widely prepped with a chlorhexidine scrub, which was then allowed to dry for 30 seconds. \par        Probe Sterilization: After the ultrasound probe was cleansed with a PDI wipe, it was sterilized with Chloraprep and sterile ultrasound gel was applied as needed. \par        Ultrasound visualization  was then performed to identify the target and estimate the needle length. \par        Procedure: A 21 gauge 2 inch needle was then inserted through the sterilized skin and directed to the target as small amounts of lidocaine were injected through it into the overlying skin, subcutaneous tissue, and the overlying tissue as needed. The target structure was then injected with the inejctate listed below under direct ultrasound visualization. Aspiration was negative for blood prior to injection. \par        Injectate: 2ccs pre-filled orthovisc syringe. \par        Post Procedure: A Band-Aid was then applied and the patient was advised to leave this on until the next day. The patient tolerated the procedure well and reported significant pain releif following the procedure. \par \par

## 2020-10-20 ENCOUNTER — RX RENEWAL (OUTPATIENT)
Age: 65
End: 2020-10-20

## 2020-10-20 RX ORDER — MELOXICAM 15 MG/1
15 TABLET ORAL
Qty: 90 | Refills: 0 | Status: ACTIVE | COMMUNITY
Start: 2020-07-29 | End: 1900-01-01

## 2020-10-22 ENCOUNTER — APPOINTMENT (OUTPATIENT)
Dept: PHYSICAL MEDICINE AND REHAB | Facility: CLINIC | Age: 65
End: 2020-10-22
Payer: COMMERCIAL

## 2020-10-22 VITALS
TEMPERATURE: 96.9 F | DIASTOLIC BLOOD PRESSURE: 78 MMHG | SYSTOLIC BLOOD PRESSURE: 150 MMHG | HEART RATE: 83 BPM | OXYGEN SATURATION: 96 %

## 2020-10-22 PROCEDURE — 20611 DRAIN/INJ JOINT/BURSA W/US: CPT | Mod: LT

## 2020-10-22 PROCEDURE — 99072 ADDL SUPL MATRL&STAF TM PHE: CPT

## 2020-10-22 NOTE — HISTORY OF PRESENT ILLNESS
[FreeTextEntry1] : 10/22/20\par 66 yo F who presents for Orthovisc injection 2 of 3.  Patient reports pain is about the same as previously with some interim swelling of left knee.  Patient denies new weakness, numbness or paresthesia.  Denies bowel/bladder dysfunction, fevers, chills, weight loss, night pain, or night sweats.\par \par 10/15/20\par 66 yo F who presents for follow up with left knee and low back pain.  Patient reports interim improvement in left knee pain with 2 x aspiration and CSI.  Patient reports now her low back pain is more prominent.  Low back pain radiates into the left lower extremity and worse with prolonged sitting and standing.  Patient reports that low back pain has been present for several months.  Patient denies new weakness, numbness or paresthesia.  Denies bowel/bladder dysfunction, fevers, chills, weight loss, night pain, or night sweats.\par \par 10/1/20\par 66 yo F who presents for follow up with left knee and low back pain.  Patient reports that left knee pain has improved significantly following US guided aspiration and CSI performed on 9/22.  Patient however continues to complain of significant pain and residual swelling following her last injection.  Denies clicking, locking, or buckling.  Patient denies new weakness, numbness or paresthesia.  Denies bowel/bladder dysfunction, fevers, chills, weight loss, night pain, or night sweats.\par \par 9/22/20\par 66 yo F who presents for follow up with left knee and low back pain.  Patient last seen on 8/27/20 and thoracentesis led to aspiration of bloody fluid.  Labs were sent based on fluid collection and was unremarkable.  MRI left knee w/o contrast shows prominent chondromalacia patella with a deep chondral fissure at the medial femoral condyle, subtle horizontal tear of the anterior horn of the lateral meniscus, djd.  In the interim since our last visit, patient prescribed a medrol dose pack without improvement for her knee or back pain.  Patient denies clicking, locking, or buckling.  Patient denies new weakness, numbness or paresthesia.  Denies bowel/bladder dysfunction, fevers, chills, weight loss, night pain, or night sweats.\par \par 8/27/20\par Patient is a 66yo F with PMHx of pre-diabetes, arthritis of the left knee, HTN, hypothyroidism, and stage 1 breast cancer of the left breast s/p partial mastectomy, who presents for initial visit following referral from Dr. Miles for evaluation of left knee pain. Patient reports symptoms that come and go and started in late July 2020. Patient currently still with significant pain with ambulation; reports swelling in left knee, though improved from last Sunday when it initially started to flare. Denied history of recent trauma, injury or falls.  Patient reports that initial onset was in late July but pain had resolved following treatment with meloxicam and physical therapy prescribed by Dr. Cordova.  Patient reports that pain is worse with prolonged standing and ambulation.  XR left consistent with mild osteoarthritis.  Patient endorses swelling.  Denies erythema, redness or warmth.  Denies clicking or locking, however endorses some occasional buckling.  \par \par Of note, patient reports history of chronic low back pain; current issue however, is regarding her left knee. Patient's daughter, who was present for the visit is getting  soon and patient would like pain relief prior to the event.  Patient denies new weakness, numbness or paresthesia.  Denies bowel/bladder dysfunction, fevers, chills, weight loss, night pain, or night sweats.\par

## 2020-10-22 NOTE — ASSESSMENT
[FreeTextEntry1] : 66 yo F who presents with left knee pain secondary to osteoarthritis.  Patient reports significant improvement in left knee following CSI and aspirations and presents now for series of viscosupplementation.  Patient also presents with low back pain consistent with lumbar radiculopathy, lumbar spinal stenosis, and lumbar disc herniation.  \par \par - US guided left knee viscosupplementation with Orthovisc (2 of 3)\par -I recommend that patient undergo left L4-L5, L5-S1 TFESI.  Risks and benefits discussed with patient.  Will submit for insurance approval.  Once insurance approval has been obtained, patient will be contacted to schedule injection at out-patient ambulatory center.  Covid-19 referral provided to patient on this visit and patient has been instructed to undergo testing per unit protocol.\par -RTC 1 week for 3 of 3 orthovisc injections.\par \par Jagdish Dacosta MD\par Spine and Sports Medicine\par \par [employer]\par At hospitals/[facility]\par \par

## 2020-10-22 NOTE — PROCEDURE
[de-identified] : Procedure: Ultrasound guided __ ORTHOVISC_______ Injections. \par        Side: LEFT. \par        Medical Necessity for ultrasound use: Ultrasound guided injection is medically necessary in order to maintain safety and localize injectate to desired location. Visual guidance of structures otherwise not palpable on examination or identified by landmarks is necessary for injection into this structure. The use of ultrasound helps to visualize nerves, vasculature, tendons, ligaments and other soft tissues which may be infiltrated during the course of interventional injection and needle placement. For this reason it is medically necessary to use ultrasound guidance for the injection performed both to avoid injurying or displacing unintended soft tissue structures as well as to improve accuracy which has been shown to directly increase post treatment symptom improvement and resolution when compared to injections performed without guidance. \par        Patient positioning: supine. \par        Target Identification: The body region was palpated as needed in order to localize the area of maximal tenderness. The overlying skin was marked as necessary . \par        Skin Sterilization: The overlying skin was widely prepped with a chlorhexidine scrub, which was then allowed to dry for 30 seconds. \par        Probe Sterilization: After the ultrasound probe was cleansed with a PDI wipe, it was sterilized with Chloraprep and sterile ultrasound gel was applied as needed. \par        Ultrasound visualization  was then performed to identify the target and estimate the needle length. \par        Procedure: A 21 gauge 2 inch needle was then inserted through the sterilized skin and directed to the target as small amounts of lidocaine were injected through it into the overlying skin, subcutaneous tissue, and the overlying tissue as needed. Suprapatellar recess was aspirated (15 cc of serosanguineous fluid) and a small dose of corticosteroids (10 mg kenalog) was injected prior to Orthovisc injection.  The target structure was then injected with the inejctate listed below under direct ultrasound visualization. Aspiration was negative for blood prior to injection. \par        Injectate: 2ccs pre-filled orthovisc syringe. \par        Post Procedure: A Band-Aid was then applied and the patient was advised to leave this on until the next day. The patient tolerated the procedure well and reported significant pain releif following the procedure. \par \par

## 2020-10-29 ENCOUNTER — APPOINTMENT (OUTPATIENT)
Dept: PHYSICAL MEDICINE AND REHAB | Facility: CLINIC | Age: 65
End: 2020-10-29
Payer: COMMERCIAL

## 2020-10-29 VITALS
HEART RATE: 88 BPM | SYSTOLIC BLOOD PRESSURE: 136 MMHG | OXYGEN SATURATION: 95 % | TEMPERATURE: 96.5 F | DIASTOLIC BLOOD PRESSURE: 83 MMHG

## 2020-10-29 PROCEDURE — 99072 ADDL SUPL MATRL&STAF TM PHE: CPT

## 2020-10-29 PROCEDURE — 20611 DRAIN/INJ JOINT/BURSA W/US: CPT | Mod: LT

## 2020-10-29 NOTE — ASSESSMENT
[FreeTextEntry1] : 64 yo F who presents with left knee pain secondary to osteoarthritis.  Patient reports significant improvement in left knee following CSI and aspirations and presents now for series of viscosupplementation.  Patient also presents with low back pain consistent with lumbar radiculopathy, lumbar spinal stenosis, and lumbar disc herniation.  \par \par - US guided left knee viscosupplementation with Orthovisc (3 of 3)\par -I recommend that patient undergo left L4-L5, L5-S1 TFESI.  Risks and benefits discussed with patient.  Will submit for insurance approval.  Once insurance approval has been obtained, patient will be contacted to schedule injection at out-patient ambulatory center.  Covid-19 referral provided to patient on this visit and patient has been instructed to undergo testing per unit protocol.\par -RTC for lumbar SARANYA\par \par Jagdish Dacosta MD\par Spine and Sports Medicine\par \par [employer]\par At [facility]\par \par

## 2020-10-29 NOTE — PROCEDURE
[de-identified] : Procedure: Ultrasound guided __ ORTHOVISC_______ Injections. \par        Side: LEFT. \par        Medical Necessity for ultrasound use: Ultrasound guided injection is medically necessary in order to maintain safety and localize injectate to desired location. Visual guidance of structures otherwise not palpable on examination or identified by landmarks is necessary for injection into this structure. The use of ultrasound helps to visualize nerves, vasculature, tendons, ligaments and other soft tissues which may be infiltrated during the course of interventional injection and needle placement. For this reason it is medically necessary to use ultrasound guidance for the injection performed both to avoid injurying or displacing unintended soft tissue structures as well as to improve accuracy which has been shown to directly increase post treatment symptom improvement and resolution when compared to injections performed without guidance. \par        Patient positioning: supine. \par        Target Identification: The body region was palpated as needed in order to localize the area of maximal tenderness. The overlying skin was marked as necessary . \par        Skin Sterilization: The overlying skin was widely prepped with a chlorhexidine scrub, which was then allowed to dry for 30 seconds. \par        Probe Sterilization: After the ultrasound probe was cleansed with a PDI wipe, it was sterilized with Chloraprep and sterile ultrasound gel was applied as needed. \par        Ultrasound visualization  was then performed to identify the target and estimate the needle length. \par        Procedure: A 21 gauge 2 inch needle was then inserted through the sterilized skin and directed to the target as small amounts of lidocaine were injected through it into the overlying skin, subcutaneous tissue, and the overlying tissue as needed. Suprapatellar recess was aspirated (8 cc of serosanguineous fluid) and a small dose of corticosteroids (10 mg kenalog) was injected prior to Orthovisc injection.  The target structure was then injected with the inejctate listed below under direct ultrasound visualization. Aspiration was negative for blood prior to injection. \par        Injectate: 2ccs pre-filled orthovisc syringe. \par        Post Procedure: A Band-Aid was then applied and the patient was advised to leave this on until the next day. The patient tolerated the procedure well and reported significant pain releif following the procedure. \par \par

## 2020-10-29 NOTE — PHYSICAL EXAM
[FreeTextEntry1] : Gen: NAD, sitting comfortably in a chair\par HEENT: neck supple\par CV: no cyanosis\par Pulm: breathing well on RA\par Abd: soft\par Left knee: mild edema, no erythma, FAROM with pain on extreme flexion and extension, tenderness to palpation medial joint line, +crepitus, +segovia's, neg miguel, neg varus/valgus laxity, neg ant/post drawer\par Low back: ROM limited in all planes secondary to pain, tenderness to palpation lower lumbar paraspinals, neg facet loading, neg FABERE, neg FAIR\par Msk: 5/5 hip flexion B/L, 5/5 knee extension B/L, 5/5 knee flexion B/L, 5/5 dorsiflexion B/L, 5/5 plantarflexion B/L\par Neuro: sensation intact to light touch in all dermatomes in bilateral lower extremities, neg babinski, neg bhatia, 2+ reflexes in bilateral patella, medial hamstrings, and achilles\par

## 2020-10-29 NOTE — HISTORY OF PRESENT ILLNESS
[FreeTextEntry1] : 10/22/20\par 64 yo F who presents for Orthovisc injection 3 of 3.  Patient reports some improvement in her pain since beginning viscosupplementation.  Patient reports that her low back pain is about the same and she is scheduled for lumbar SARANYA on 11/16.  Patient denies clicking, locking or buckling.  Patient denies new weakness, numbness or paresthesia.  Denies bowel/bladder dysfunction, fevers, chills, weight loss, night pain, or night sweats.\par \par 10/15/20\par 64 yo F who presents for follow up with left knee and low back pain.  Patient reports interim improvement in left knee pain with 2 x aspiration and CSI.  Patient reports now her low back pain is more prominent.  Low back pain radiates into the left lower extremity and worse with prolonged sitting and standing.  Patient reports that low back pain has been present for several months.  Patient denies new weakness, numbness or paresthesia.  Denies bowel/bladder dysfunction, fevers, chills, weight loss, night pain, or night sweats.\par \par 10/1/20\par 64 yo F who presents for follow up with left knee and low back pain.  Patient reports that left knee pain has improved significantly following US guided aspiration and CSI performed on 9/22.  Patient however continues to complain of significant pain and residual swelling following her last injection.  Denies clicking, locking, or buckling.  Patient denies new weakness, numbness or paresthesia.  Denies bowel/bladder dysfunction, fevers, chills, weight loss, night pain, or night sweats.\par \par 9/22/20\par 64 yo F who presents for follow up with left knee and low back pain.  Patient last seen on 8/27/20 and thoracentesis led to aspiration of bloody fluid.  Labs were sent based on fluid collection and was unremarkable.  MRI left knee w/o contrast shows prominent chondromalacia patella with a deep chondral fissure at the medial femoral condyle, subtle horizontal tear of the anterior horn of the lateral meniscus, djd.  In the interim since our last visit, patient prescribed a medrol dose pack without improvement for her knee or back pain.  Patient denies clicking, locking, or buckling.  Patient denies new weakness, numbness or paresthesia.  Denies bowel/bladder dysfunction, fevers, chills, weight loss, night pain, or night sweats.\par \par 8/27/20\par Patient is a 64yo F with PMHx of pre-diabetes, arthritis of the left knee, HTN, hypothyroidism, and stage 1 breast cancer of the left breast s/p partial mastectomy, who presents for initial visit following referral from Dr. Miles for evaluation of left knee pain. Patient reports symptoms that come and go and started in late July 2020. Patient currently still with significant pain with ambulation; reports swelling in left knee, though improved from last Sunday when it initially started to flare. Denied history of recent trauma, injury or falls.  Patient reports that initial onset was in late July but pain had resolved following treatment with meloxicam and physical therapy prescribed by Dr. Cordova.  Patient reports that pain is worse with prolonged standing and ambulation.  XR left consistent with mild osteoarthritis.  Patient endorses swelling.  Denies erythema, redness or warmth.  Denies clicking or locking, however endorses some occasional buckling.  \par \par Of note, patient reports history of chronic low back pain; current issue however, is regarding her left knee. Patient's daughter, who was present for the visit is getting  soon and patient would like pain relief prior to the event.  Patient denies new weakness, numbness or paresthesia.  Denies bowel/bladder dysfunction, fevers, chills, weight loss, night pain, or night sweats.\par

## 2020-11-09 ENCOUNTER — APPOINTMENT (OUTPATIENT)
Dept: DISASTER EMERGENCY | Facility: CLINIC | Age: 65
End: 2020-11-09

## 2020-11-10 LAB — SARS-COV-2 N GENE NPH QL NAA+PROBE: NOT DETECTED

## 2020-11-13 ENCOUNTER — APPOINTMENT (OUTPATIENT)
Dept: PHYSICAL MEDICINE AND REHAB | Facility: CLINIC | Age: 65
End: 2020-11-13

## 2020-11-13 ENCOUNTER — OUTPATIENT (OUTPATIENT)
Dept: OUTPATIENT SERVICES | Facility: HOSPITAL | Age: 65
LOS: 1 days | End: 2020-11-13
Payer: COMMERCIAL

## 2020-11-13 DIAGNOSIS — Z98.89 OTHER SPECIFIED POSTPROCEDURAL STATES: Chronic | ICD-10-CM

## 2020-11-13 DIAGNOSIS — M54.16 RADICULOPATHY, LUMBAR REGION: ICD-10-CM

## 2020-11-13 PROCEDURE — 64483 NJX AA&/STRD TFRM EPI L/S 1: CPT

## 2020-11-13 PROCEDURE — 64484 NJX AA&/STRD TFRM EPI L/S EA: CPT

## 2020-11-13 PROCEDURE — 64484 NJX AA&/STRD TFRM EPI L/S EA: CPT | Mod: LT

## 2020-11-13 PROCEDURE — 64483 NJX AA&/STRD TFRM EPI L/S 1: CPT | Mod: LT

## 2020-11-25 ENCOUNTER — APPOINTMENT (OUTPATIENT)
Dept: PHYSICAL MEDICINE AND REHAB | Facility: CLINIC | Age: 65
End: 2020-11-25

## 2020-12-09 ENCOUNTER — APPOINTMENT (OUTPATIENT)
Dept: PHYSICAL MEDICINE AND REHAB | Facility: CLINIC | Age: 65
End: 2020-12-09
Payer: COMMERCIAL

## 2020-12-09 VITALS
TEMPERATURE: 97.4 F | DIASTOLIC BLOOD PRESSURE: 79 MMHG | HEART RATE: 78 BPM | OXYGEN SATURATION: 97 % | SYSTOLIC BLOOD PRESSURE: 150 MMHG

## 2020-12-09 DIAGNOSIS — M17.12 UNILATERAL PRIMARY OSTEOARTHRITIS, LEFT KNEE: ICD-10-CM

## 2020-12-09 DIAGNOSIS — G89.29 PAIN IN RIGHT KNEE: ICD-10-CM

## 2020-12-09 DIAGNOSIS — M51.36 OTHER INTERVERTEBRAL DISC DEGENERATION, LUMBAR REGION: ICD-10-CM

## 2020-12-09 DIAGNOSIS — M17.11 UNILATERAL PRIMARY OSTEOARTHRITIS, RIGHT KNEE: ICD-10-CM

## 2020-12-09 DIAGNOSIS — M25.562 PAIN IN LEFT KNEE: ICD-10-CM

## 2020-12-09 DIAGNOSIS — G89.29 LUMBAGO WITH SCIATICA, LEFT SIDE: ICD-10-CM

## 2020-12-09 DIAGNOSIS — M54.42 LUMBAGO WITH SCIATICA, LEFT SIDE: ICD-10-CM

## 2020-12-09 DIAGNOSIS — N63.10 UNSPECIFIED LUMP IN THE RIGHT BREAST, UNSPECIFIED QUADRANT: ICD-10-CM

## 2020-12-09 DIAGNOSIS — M25.561 PAIN IN RIGHT KNEE: ICD-10-CM

## 2020-12-09 DIAGNOSIS — F41.9 ANXIETY DISORDER, UNSPECIFIED: ICD-10-CM

## 2020-12-09 PROCEDURE — 99072 ADDL SUPL MATRL&STAF TM PHE: CPT

## 2020-12-09 PROCEDURE — 99214 OFFICE O/P EST MOD 30 MIN: CPT

## 2020-12-09 RX ORDER — DIAZEPAM 5 MG/1
5 TABLET ORAL
Qty: 2 | Refills: 0 | Status: ACTIVE | COMMUNITY
Start: 2020-12-09 | End: 1900-01-01

## 2020-12-09 RX ORDER — MELOXICAM 15 MG/1
15 TABLET ORAL
Qty: 30 | Refills: 1 | Status: ACTIVE | COMMUNITY
Start: 2020-12-09 | End: 1900-01-01

## 2020-12-09 NOTE — HISTORY OF PRESENT ILLNESS
[FreeTextEntry1] : 12/9/20\par 64 yo F who presents for follow up with left knee and low back pain.  Patient s/p left L4-L5, L5-S1 TFESI on 11/13/20.  Patient reports a greater than 50% improvement in her pain temporarily.  However, pain returned shortly thereafter and is now back to baseline.  Patient also reports left knee stiffness that has improved significantly following viscosupplementation and corticosteroid injection.  Patient takes occasional tylenol and mobic for the pain.  No PT currently for bilateral knee OA.  No buckling, locking, or clicking.  Patient denies new weakness, numbness or paresthesia.  Denies bowel/bladder dysfunction, fevers, chills, weight loss, night pain, or night sweats.\par \par 10/22/20\par 64 yo F who presents for Orthovisc injection 3 of 3.  Patient reports some improvement in her pain since beginning viscosupplementation.  Patient reports that her low back pain is about the same and she is scheduled for lumbar SARANYA on 11/16.  Patient denies clicking, locking or buckling.  Patient denies new weakness, numbness or paresthesia.  Denies bowel/bladder dysfunction, fevers, chills, weight loss, night pain, or night sweats.\par \par 10/15/20\par 64 yo F who presents for follow up with left knee and low back pain.  Patient reports interim improvement in left knee pain with 2 x aspiration and CSI.  Patient reports now her low back pain is more prominent.  Low back pain radiates into the left lower extremity and worse with prolonged sitting and standing.  Patient reports that low back pain has been present for several months.  Patient denies new weakness, numbness or paresthesia.  Denies bowel/bladder dysfunction, fevers, chills, weight loss, night pain, or night sweats.\par \par 10/1/20\par 64 yo F who presents for follow up with left knee and low back pain.  Patient reports that left knee pain has improved significantly following US guided aspiration and CSI performed on 9/22.  Patient however continues to complain of significant pain and residual swelling following her last injection.  Denies clicking, locking, or buckling.  Patient denies new weakness, numbness or paresthesia.  Denies bowel/bladder dysfunction, fevers, chills, weight loss, night pain, or night sweats.\par \par 9/22/20\par 64 yo F who presents for follow up with left knee and low back pain.  Patient last seen on 8/27/20 and thoracentesis led to aspiration of bloody fluid.  Labs were sent based on fluid collection and was unremarkable.  MRI left knee w/o contrast shows prominent chondromalacia patella with a deep chondral fissure at the medial femoral condyle, subtle horizontal tear of the anterior horn of the lateral meniscus, djd.  In the interim since our last visit, patient prescribed a medrol dose pack without improvement for her knee or back pain.  Patient denies clicking, locking, or buckling.  Patient denies new weakness, numbness or paresthesia.  Denies bowel/bladder dysfunction, fevers, chills, weight loss, night pain, or night sweats.\par \par 8/27/20\par Patient is a 64yo F with PMHx of pre-diabetes, arthritis of the left knee, HTN, hypothyroidism, and stage 1 breast cancer of the left breast s/p partial mastectomy, who presents for initial visit following referral from Dr. Miles for evaluation of left knee pain. Patient reports symptoms that come and go and started in late July 2020. Patient currently still with significant pain with ambulation; reports swelling in left knee, though improved from last Sunday when it initially started to flare. Denied history of recent trauma, injury or falls.  Patient reports that initial onset was in late July but pain had resolved following treatment with meloxicam and physical therapy prescribed by Dr. Cordova.  Patient reports that pain is worse with prolonged standing and ambulation.  XR left consistent with mild osteoarthritis.  Patient endorses swelling.  Denies erythema, redness or warmth.  Denies clicking or locking, however endorses some occasional buckling.  \par \par Of note, patient reports history of chronic low back pain; current issue however, is regarding her left knee. Patient's daughter, who was present for the visit is getting  soon and patient would like pain relief prior to the event.  Patient denies new weakness, numbness or paresthesia.  Denies bowel/bladder dysfunction, fevers, chills, weight loss, night pain, or night sweats.\par

## 2020-12-09 NOTE — PHYSICAL EXAM
[FreeTextEntry1] : Gen: NAD, sitting comfortably in a chair\par HEENT: neck supple\par CV: no cyanosis\par Pulm: breathing well on RA\par Abd: soft\par Left knee: mild edema, no erythma, FAROM with pain on extreme flexion and extension, tenderness to palpation medial joint line, +crepitus, +segovia's, neg miguel, neg varus/valgus laxity, neg ant/post drawer\par Low back: ROM limited in all planes secondary to pain, tenderness to palpation left lower lumbar paraspinals, pos left sided straight leg raise, neg facet loading, neg FABERE, neg FAIR\par Msk: 5/5 hip flexion B/L, 5/5 knee extension B/L, 5/5 knee flexion B/L, 5/5 dorsiflexion B/L, 5/5 plantarflexion B/L\par Neuro: sensation intact to light touch in all dermatomes in bilateral lower extremities, neg babinski, neg bhatia, 2+ reflexes in bilateral patella, medial hamstrings, and achilles\par

## 2020-12-09 NOTE — ASSESSMENT
[FreeTextEntry1] : 66 yo F who presents with left knee pain secondary to osteoarthritis.  Patient reports significant improvement in left knee following CSI and aspirations and presents now for series of viscosupplementation.  Patient also presents with low back pain consistent with lumbar radiculopathy, lumbar spinal stenosis, and lumbar disc herniation.  Patient with partial (greater than 50%) pain relief following initial left L4-L5, L5-S1 TFESI.  It is my hope that a second injection will lead to a greater amount of pain relief for a longer duration.\par \par -I recommend that patient undergo repeat left L4-L5, L5-S1 TFESI.  Risks and benefits discussed with patient.  Will submit for insurance approval.  Once insurance approval has been obtained, patient will be contacted to schedule injection at out-patient ambulatory center.  Covid-19 referral provided to patient on this visit and patient has been instructed to undergo testing per unit protocol.\par -5 mg PO valium 1 tablet to be taken one hour prior to procedure.  Second 5 mg PO valium tablet prescribed and patient instructed to take this medication immediately prior to the injection if anxiety persists and side effects are tolerable from initial dose.  [place] I-stop checked and no signs of abuse.\par -Start PT/HEP for bilateral knee OA, new referral provided\par -Start mobic 15 mg PO qdaily x 30 days prn pain, recommend to take with food.  Denies CKD, CAD, or gastritis.  Recommend that if patient develops GI symptoms including abdominal pain, nausea, or vomiting to discontinue use of medication immediately.\par \par Jagdish Dacosta MD\par Spine and Sports Medicine\par \par [employer]\par At [facility]\par \par

## 2020-12-12 ENCOUNTER — APPOINTMENT (OUTPATIENT)
Dept: DISASTER EMERGENCY | Facility: CLINIC | Age: 65
End: 2020-12-12

## 2020-12-13 ENCOUNTER — APPOINTMENT (OUTPATIENT)
Dept: DISASTER EMERGENCY | Facility: CLINIC | Age: 65
End: 2020-12-13

## 2020-12-14 RX ORDER — DICLOFENAC SODIUM 1% 10 MG/G
1 GEL TOPICAL
Qty: 1 | Refills: 0 | Status: ACTIVE | COMMUNITY
Start: 2020-12-14 | End: 1900-01-01

## 2020-12-15 LAB — SARS-COV-2 N GENE NPH QL NAA+PROBE: DETECTED

## 2020-12-17 ENCOUNTER — NON-APPOINTMENT (OUTPATIENT)
Age: 65
End: 2020-12-17

## 2020-12-18 ENCOUNTER — APPOINTMENT (OUTPATIENT)
Dept: PHYSICAL MEDICINE AND REHAB | Facility: CLINIC | Age: 65
End: 2020-12-18

## 2021-01-06 ENCOUNTER — APPOINTMENT (OUTPATIENT)
Dept: PHYSICAL MEDICINE AND REHAB | Facility: CLINIC | Age: 66
End: 2021-01-06

## 2021-03-04 ENCOUNTER — RX RENEWAL (OUTPATIENT)
Age: 66
End: 2021-03-04

## 2021-04-07 ENCOUNTER — OUTPATIENT (OUTPATIENT)
Dept: OUTPATIENT SERVICES | Facility: HOSPITAL | Age: 66
LOS: 1 days | End: 2021-04-07
Payer: COMMERCIAL

## 2021-04-07 ENCOUNTER — APPOINTMENT (OUTPATIENT)
Dept: MAMMOGRAPHY | Facility: CLINIC | Age: 66
End: 2021-04-07
Payer: COMMERCIAL

## 2021-04-07 ENCOUNTER — APPOINTMENT (OUTPATIENT)
Dept: ULTRASOUND IMAGING | Facility: CLINIC | Age: 66
End: 2021-04-07
Payer: COMMERCIAL

## 2021-04-07 DIAGNOSIS — Z98.89 OTHER SPECIFIED POSTPROCEDURAL STATES: Chronic | ICD-10-CM

## 2021-04-07 DIAGNOSIS — Z12.31 ENCOUNTER FOR SCREENING MAMMOGRAM FOR MALIGNANT NEOPLASM OF BREAST: ICD-10-CM

## 2021-04-07 PROCEDURE — 76641 ULTRASOUND BREAST COMPLETE: CPT | Mod: 26,50

## 2021-04-07 PROCEDURE — 77063 BREAST TOMOSYNTHESIS BI: CPT | Mod: 26

## 2021-04-07 PROCEDURE — 76641 ULTRASOUND BREAST COMPLETE: CPT

## 2021-04-07 PROCEDURE — 77067 SCR MAMMO BI INCL CAD: CPT

## 2021-04-07 PROCEDURE — 77063 BREAST TOMOSYNTHESIS BI: CPT

## 2021-04-07 PROCEDURE — 77067 SCR MAMMO BI INCL CAD: CPT | Mod: 26

## 2022-05-12 ENCOUNTER — APPOINTMENT (OUTPATIENT)
Dept: ULTRASOUND IMAGING | Facility: CLINIC | Age: 67
End: 2022-05-12

## 2022-05-12 ENCOUNTER — APPOINTMENT (OUTPATIENT)
Dept: MAMMOGRAPHY | Facility: CLINIC | Age: 67
End: 2022-05-12

## 2022-08-31 ENCOUNTER — APPOINTMENT (OUTPATIENT)
Dept: ULTRASOUND IMAGING | Facility: CLINIC | Age: 67
End: 2022-08-31

## 2022-08-31 ENCOUNTER — APPOINTMENT (OUTPATIENT)
Dept: MAMMOGRAPHY | Facility: CLINIC | Age: 67
End: 2022-08-31

## 2022-08-31 ENCOUNTER — OUTPATIENT (OUTPATIENT)
Dept: OUTPATIENT SERVICES | Facility: HOSPITAL | Age: 67
LOS: 1 days | End: 2022-08-31
Payer: MEDICARE

## 2022-08-31 DIAGNOSIS — Z00.00 ENCOUNTER FOR GENERAL ADULT MEDICAL EXAMINATION WITHOUT ABNORMAL FINDINGS: ICD-10-CM

## 2022-08-31 DIAGNOSIS — Z98.89 OTHER SPECIFIED POSTPROCEDURAL STATES: Chronic | ICD-10-CM

## 2022-08-31 PROCEDURE — 76641 ULTRASOUND BREAST COMPLETE: CPT | Mod: 26,50

## 2022-08-31 PROCEDURE — 76830 TRANSVAGINAL US NON-OB: CPT

## 2022-08-31 PROCEDURE — 77063 BREAST TOMOSYNTHESIS BI: CPT | Mod: 26

## 2022-08-31 PROCEDURE — 77067 SCR MAMMO BI INCL CAD: CPT | Mod: 26

## 2022-08-31 PROCEDURE — 76856 US EXAM PELVIC COMPLETE: CPT

## 2022-08-31 PROCEDURE — 77067 SCR MAMMO BI INCL CAD: CPT

## 2022-08-31 PROCEDURE — 76830 TRANSVAGINAL US NON-OB: CPT | Mod: 26

## 2022-08-31 PROCEDURE — 76857 US EXAM PELVIC LIMITED: CPT | Mod: 26,59

## 2022-08-31 PROCEDURE — 76641 ULTRASOUND BREAST COMPLETE: CPT

## 2022-08-31 PROCEDURE — 77063 BREAST TOMOSYNTHESIS BI: CPT

## 2024-12-16 ENCOUNTER — NON-APPOINTMENT (OUTPATIENT)
Age: 69
End: 2024-12-16

## 2024-12-17 ENCOUNTER — APPOINTMENT (OUTPATIENT)
Dept: DERMATOLOGY | Facility: CLINIC | Age: 69
End: 2024-12-17
Payer: MEDICARE

## 2024-12-17 VITALS — BODY MASS INDEX: 22.38 KG/M2 | HEIGHT: 60 IN | WEIGHT: 114 LBS

## 2024-12-17 DIAGNOSIS — L82.1 OTHER SEBORRHEIC KERATOSIS: ICD-10-CM

## 2024-12-17 DIAGNOSIS — D22.9 MELANOCYTIC NEVI, UNSPECIFIED: ICD-10-CM

## 2024-12-17 DIAGNOSIS — L82.0 INFLAMED SEBORRHEIC KERATOSIS: ICD-10-CM

## 2024-12-17 PROCEDURE — 17110 DESTRUCTION B9 LES UP TO 14: CPT

## 2024-12-17 PROCEDURE — 99203 OFFICE O/P NEW LOW 30 MIN: CPT | Mod: 25
